# Patient Record
Sex: FEMALE | URBAN - METROPOLITAN AREA
[De-identification: names, ages, dates, MRNs, and addresses within clinical notes are randomized per-mention and may not be internally consistent; named-entity substitution may affect disease eponyms.]

---

## 2020-07-15 ENCOUNTER — OFFICE VISIT (OUTPATIENT)
Dept: URBAN - METROPOLITAN AREA CLINIC 11 | Facility: CLINIC | Age: 55
End: 2020-07-15
Payer: COMMERCIAL

## 2020-07-15 DIAGNOSIS — H25.13 AGE-RELATED NUCLEAR CATARACT, BILATERAL: ICD-10-CM

## 2020-07-15 DIAGNOSIS — R73.03 PREDIABETES: Primary | ICD-10-CM

## 2020-07-15 PROCEDURE — 92004 COMPRE OPH EXAM NEW PT 1/>: CPT | Performed by: OPTOMETRIST

## 2020-07-15 ASSESSMENT — KERATOMETRY
OD: 44.50
OS: 44.75

## 2020-07-15 ASSESSMENT — INTRAOCULAR PRESSURE
OD: 12
OS: 13

## 2020-07-15 NOTE — IMPRESSION/PLAN
Impression: Age-related nuclear cataract, bilateral: H25.13. Plan: Discussed diagnosis in detail with patient. No treatment is required at this time. Will continue to observe condition and or symptoms. Call if [address] worsens.

## 2022-08-23 ENCOUNTER — OFFICE VISIT (OUTPATIENT)
Facility: LOCATION | Age: 57
End: 2022-08-23
Payer: COMMERCIAL

## 2022-08-23 DIAGNOSIS — H52.223 REGULAR ASTIGMATISM, BILATERAL: Primary | ICD-10-CM

## 2022-08-23 DIAGNOSIS — H25.13 AGE-RELATED NUCLEAR CATARACT, BILATERAL: ICD-10-CM

## 2022-08-23 PROCEDURE — 92014 COMPRE OPH EXAM EST PT 1/>: CPT | Performed by: OPTOMETRIST

## 2022-08-23 ASSESSMENT — INTRAOCULAR PRESSURE
OD: 19
OS: 19

## 2022-08-23 ASSESSMENT — VISUAL ACUITY
OS: 20/20
OD: 20/20

## 2022-08-23 NOTE — IMPRESSION/PLAN
Impression: Regular astigmatism, bilateral: H52.223. Plan: Glasses prescription helps improve vision. Rx provided as requested.

## 2022-10-10 ENCOUNTER — TESTING ONLY (OUTPATIENT)
Facility: LOCATION | Age: 57
End: 2022-10-10
Payer: COMMERCIAL

## 2022-10-10 DIAGNOSIS — H52.223 REGULAR ASTIGMATISM, BILATERAL: Primary | ICD-10-CM

## 2022-10-10 PROCEDURE — 92015 DETERMINE REFRACTIVE STATE: CPT | Performed by: OPTOMETRIST

## 2022-10-10 NOTE — IMPRESSION/PLAN
Impression: Regular astigmatism, bilateral: H52.223. Plan: Patient in for recheck . Very minimal change on re- refraction . Discussed adaptation vs switching back to old RX. Patient would like to keep current RX and adapt .